# Patient Record
Sex: MALE | Race: WHITE | NOT HISPANIC OR LATINO | Employment: STUDENT | URBAN - METROPOLITAN AREA
[De-identification: names, ages, dates, MRNs, and addresses within clinical notes are randomized per-mention and may not be internally consistent; named-entity substitution may affect disease eponyms.]

---

## 2020-09-18 ENCOUNTER — TELEMEDICINE (OUTPATIENT)
Dept: GASTROENTEROLOGY | Facility: CLINIC | Age: 25
End: 2020-09-18
Payer: COMMERCIAL

## 2020-09-18 VITALS — WEIGHT: 165 LBS | HEIGHT: 72 IN | BODY MASS INDEX: 22.35 KG/M2

## 2020-09-18 DIAGNOSIS — R19.7 DIARRHEA, UNSPECIFIED TYPE: Primary | ICD-10-CM

## 2020-09-18 DIAGNOSIS — R11.0 NAUSEA: ICD-10-CM

## 2020-09-18 PROCEDURE — 99204 OFFICE O/P NEW MOD 45 MIN: CPT | Performed by: INTERNAL MEDICINE

## 2020-09-18 RX ORDER — CETIRIZINE HYDROCHLORIDE 10 MG/1
10 TABLET ORAL DAILY
COMMUNITY

## 2020-09-18 RX ORDER — DICYCLOMINE HYDROCHLORIDE 10 MG/1
10 CAPSULE ORAL
Qty: 120 CAPSULE | Refills: 2 | Status: SHIPPED | OUTPATIENT
Start: 2020-09-18

## 2020-09-18 NOTE — PROGRESS NOTES
Virtual Regular Visit      Assessment/Plan:    Problem List Items Addressed This Visit     None      Visit Diagnoses     Diarrhea, unspecified type    -  Primary    Relevant Medications    dicyclomine (BENTYL) 10 mg capsule    Other Relevant Orders    CBC    Comprehensive metabolic panel    C-reactive protein    Giardia antigen    Ova and parasite examination    Clostridium difficile toxin by PCR with EIA    Celiac Disease Comprehensive Panel    Nausea            Nausea dyspepsia and diarrhea - patient's symptoms are longstanding but worsening recently particularly the diarrhea  Symptoms are most suggestive of diarrhea predominant irritable bowel syndrome  Celiac disease should be excluded  Chronic infectious etiologies such as Giardia are possible but acute infection with C diff has to be ruled out given his recent course of clindamycin  Other bacterial infections less likely  Consider inflammatory bowel disease  · Will try to obtain recent stool studies  · Check CBC CMP CRP and celiac screen  · Stool for Giardia, parasites and C diff  · Trial of dicyclomine  · Reassess in 4-6 weeks, if stools negative and symptoms persist consider endoscopic evaluation for inflammatory bowel disease celiac and microscopic colitis       Reason for visit is   Chief Complaint   Patient presents with    Nausea x6 years, diarrhea off and on x1 year    Virtual Regular Visit        Encounter provider Irma Trammell MD    Provider located at [address] [address] [phone]      Recent Visits  No visits were found meeting these conditions     Showing recent visits within past 7 days and meeting all other requirements     Today's Visits  Date Type Provider Dept   09/18/20 Telemedicine Irma Trammell MD [facility]   Showing today's visits and meeting all other requirements     Future Appointments  No visits were found meeting these conditions  Showing future appointments within next 150 days and meeting all other requirements        The patient was identified by name and date of birth  Leora Ya was informed that this is a telemedicine visit and that the visit is being conducted through [facility] [place] and patient was informed that this is not a secure, HIPAA-complaint platform  He agrees to proceed     My office door was closed  No one else was in the room  He acknowledged consent and understanding of privacy and security of the video platform  The patient has agreed to participate and understands they can discontinue the visit at any time  Patient is aware this is a billable service  Subjective  Leora Ya is a 23 y o  male with several years of dyspepsia nausea and loose stools  HPI   Has history of stomach issues  Worse after taking clindamycin for oral infection  Nausea daily starting six years ago  Worse with hunger and stress  Had EGD/GI work up five years ago  Ruled out gall bladder problem? Took pantoprazole years ago, maybe helped  Little help with Pepcid OTC taking now  Epigastric tenderness  Past year, having alternating diarrhea and constipation  Bloating  Worse in am   Rare vomiting, with stress  Often lose urgent stools in the am   Flaky stools  No blood or melena in stools  Since clindamycin diarrhea is worse  Also had some stomach cramping  Had testing for C diff  and other cultures  ? Celiac testing? History reviewed  No pertinent past medical history      Past Surgical History:   Procedure Laterality Date    UPPER GASTROINTESTINAL ENDOSCOPY      WISDOM TOOTH EXTRACTION         Current Outpatient Medications   Medication Sig Dispense Refill    cetirizine (ZyrTEC) 10 mg tablet Take 10 mg by mouth daily      dicyclomine (BENTYL) 10 mg capsule Take 1 capsule (10 mg total) by mouth 4 (four) times a day (before meals and at bedtime) 120 capsule 2     No current facility-administered medications for this visit  Allergies   Allergen Reactions    Amoxicillin        Review of Systems   Constitutional: Negative for activity change, appetite change, fever and unexpected weight change  Gastrointestinal: Positive for abdominal pain, constipation, diarrhea and nausea  Negative for abdominal distention, blood in stool and vomiting  Video Exam    Vitals:    09/18/20 1349   Weight: 74 8 kg (165 lb)   Height: 6' (1 829 m)       Physical Exam  Abdominal:      General: Abdomen is flat  There is no distension  Tenderness: There is abdominal tenderness  Neurological:      Mental Status: He is alert  epigastric tenderness with palpation by patient    I spent 30 minutes directly with the patient during this visit      Yuki Ziegler acknowledges that he has consented to an online visit or consultation  He understands that the online visit is based solely on information provided by him, and that, in the absence of a face-to-face physical evaluation by the physician, the diagnosis he receives is both limited and provisional in terms of accuracy and completeness  This is not intended to replace a full medical face-to-face evaluation by the physician  Aiyana Freeman understands and accepts these terms

## 2020-09-18 NOTE — PATIENT INSTRUCTIONS
Diet as tolerated  Avoid fatty foods caffeine alcohol and any known trigger foods  Stool studies and blood work  Trial of dicyclomine  Office follow-up in 4-6 weeks

## 2020-09-18 NOTE — LETTER
September 18, 2020     Joanne Vigil MD  [address] [facility] [address]    Patient: Trevin Feliciano   YOB: 1995   Date of Visit: 9/18/2020       Dear Dr Gray Valdes:    Thank you for referring Trevin Feliciano to me for evaluation  Below are my notes for this consultation  If you have questions, please do not hesitate to call me  I look forward to following your patient along with you  Sincerely,        Akiko Naiud MD        CC: No Recipients  Akiko Naidu MD  9/18/2020  5:28 PM  Sign when Signing Visit    Virtual Regular Visit      Assessment/Plan:    Problem List Items Addressed This Visit     None      Visit Diagnoses     Diarrhea, unspecified type    -  Primary    Relevant Medications    dicyclomine (BENTYL) 10 mg capsule    Other Relevant Orders    CBC    Comprehensive metabolic panel    C-reactive protein    Giardia antigen    Ova and parasite examination    Clostridium difficile toxin by PCR with EIA    Celiac Disease Comprehensive Panel    Nausea            Nausea dyspepsia and diarrhea - patient's symptoms are longstanding but worsening recently particularly the diarrhea  Symptoms are most suggestive of diarrhea predominant irritable bowel syndrome  Celiac disease should be excluded  Chronic infectious etiologies such as Giardia are possible but acute infection with C diff has to be ruled out given his recent course of clindamycin  Other bacterial infections less likely  Consider inflammatory bowel disease    · Will try to obtain recent stool studies  · Check CBC CMP CRP and celiac screen  · Stool for Giardia, parasites and C diff  · Trial of dicyclomine  · Reassess in 4-6 weeks, if stools negative and symptoms persist consider endoscopic evaluation for inflammatory bowel disease celiac and microscopic colitis       Reason for visit is   Chief Complaint   Patient presents with    Nausea x6 years, diarrhea off and on x1 year    Virtual Regular Visit        Encounter provider Akiko Naidu MD    Provider located at [address] [id number] [phone]      Recent Visits  No visits were found meeting these conditions  Showing recent visits within past 7 days and meeting all other requirements     Today's Visits  Date Type Provider Dept   09/18/20 Telemedicine Robson Boyce MD [facility]   Showing today's visits and meeting all other requirements     Future Appointments  No visits were found meeting these conditions  Showing future appointments within next 150 days and meeting all other requirements        The patient was identified by name and date of birth  Viviane Cullen was informed that this is a telemedicine visit and that the visit is being conducted through [facility] and patient was informed that this is not a secure, HIPAA-complaint platform  He agrees to proceed     My office door was closed  No one else was in the room  He acknowledged consent and understanding of privacy and security of the video platform  The patient has agreed to participate and understands they can discontinue the visit at any time  Patient is aware this is a billable service  Subjective  Viviane Cullen is a 23 y o  male with several years of dyspepsia nausea and loose stools  HPI   Has history of stomach issues  Worse after taking clindamycin for oral infection  Nausea daily starting six years ago  Worse with hunger and stress  Had EGD/GI work up five years ago  Ruled out gall bladder problem? Took pantoprazole years ago, maybe helped  Little help with Pepcid OTC taking now  Epigastric tenderness  Past year, having alternating diarrhea and constipation  Bloating  Worse in am   Rare vomiting, with stress  Often lose urgent stools in the am   Flaky stools  No blood or melena in stools  Since clindamycin diarrhea is worse  Also had some stomach cramping    Had testing for C diff  and other cultures  ? Celiac testing? History reviewed  No pertinent past medical history  Past Surgical History:   Procedure Laterality Date    UPPER GASTROINTESTINAL ENDOSCOPY      WISDOM TOOTH EXTRACTION         Current Outpatient Medications   Medication Sig Dispense Refill    cetirizine (ZyrTEC) 10 mg tablet Take 10 mg by mouth daily      dicyclomine (BENTYL) 10 mg capsule Take 1 capsule (10 mg total) by mouth 4 (four) times a day (before meals and at bedtime) 120 capsule 2     No current facility-administered medications for this visit  Allergies   Allergen Reactions    Amoxicillin        Review of Systems   Constitutional: Negative for activity change, appetite change, fever and unexpected weight change  Gastrointestinal: Positive for abdominal pain, constipation, diarrhea and nausea  Negative for abdominal distention, blood in stool and vomiting  Video Exam    Vitals:    09/18/20 1349   Weight: 74 8 kg (165 lb)   Height: 6' (1 829 m)       Physical Exam  Abdominal:      General: Abdomen is flat  There is no distension  Tenderness: There is abdominal tenderness  Neurological:      Mental Status: He is alert  epigastric tenderness with palpation by patient    I spent 30 minutes directly with the patient during this visit      Yuki Ziegler acknowledges that he has consented to an online visit or consultation  He understands that the online visit is based solely on information provided by him, and that, in the absence of a face-to-face physical evaluation by the physician, the diagnosis he receives is both limited and provisional in terms of accuracy and completeness  This is not intended to replace a full medical face-to-face evaluation by the physician  Prateek Hauser understands and accepts these terms

## 2020-09-21 ENCOUNTER — TELEPHONE (OUTPATIENT)
Dept: GASTROENTEROLOGY | Facility: CLINIC | Age: 25
End: 2020-09-21

## 2020-09-25 NOTE — TELEPHONE ENCOUNTER
Patient placed on Flagyl 1000 mg day X 10 days and Cephalexin 1500 mg X 7 days for tooth extraction  I asked that patient complete labs/stool studies as ordered

## 2020-09-25 NOTE — TELEPHONE ENCOUNTER
Patient called stating he has been placed on antibiotic for tooth extraction  He is wondering if he should postpone completing testing until off antibiotic   I returned call, no answer, left message to call back to inform us what med he was ordered by dentist

## 2020-09-29 LAB
ALBUMIN SERPL-MCNC: 4.6 G/DL (ref 3.6–5.1)
ALBUMIN/GLOB SERPL: 1.8 (CALC) (ref 1–2.5)
ALP SERPL-CCNC: 77 U/L (ref 36–130)
ALT SERPL-CCNC: 18 U/L (ref 9–46)
AST SERPL-CCNC: 23 U/L (ref 10–40)
BILIRUB SERPL-MCNC: 0.7 MG/DL (ref 0.2–1.2)
BUN SERPL-MCNC: 10 MG/DL (ref 7–25)
BUN/CREAT SERPL: ABNORMAL (CALC) (ref 6–22)
CALCIUM SERPL-MCNC: 9.6 MG/DL (ref 8.6–10.3)
CHLORIDE SERPL-SCNC: 105 MMOL/L (ref 98–110)
CO2 SERPL-SCNC: 27 MMOL/L (ref 20–32)
CREAT SERPL-MCNC: 0.78 MG/DL (ref 0.6–1.35)
CRP SERPL-MCNC: 14.1 MG/L
ERYTHROCYTE [DISTWIDTH] IN BLOOD BY AUTOMATED COUNT: 12.1 % (ref 11–15)
GLOBULIN SER CALC-MCNC: 2.5 G/DL (CALC) (ref 1.9–3.7)
GLUCOSE SERPL-MCNC: 101 MG/DL (ref 65–99)
HCT VFR BLD AUTO: 44.7 % (ref 38.5–50)
HGB BLD-MCNC: 15.4 G/DL (ref 13.2–17.1)
IGA SERPL-MCNC: 558 MG/DL (ref 47–310)
MCH RBC QN AUTO: 31 PG (ref 27–33)
MCHC RBC AUTO-ENTMCNC: 34.5 G/DL (ref 32–36)
MCV RBC AUTO: 89.9 FL (ref 80–100)
PLATELET # BLD AUTO: 110 THOUSAND/UL (ref 140–400)
PMV BLD REES-ECKER: 12.4 FL (ref 7.5–12.5)
POTASSIUM SERPL-SCNC: 3.9 MMOL/L (ref 3.5–5.3)
PROT SERPL-MCNC: 7.1 G/DL (ref 6.1–8.1)
RBC # BLD AUTO: 4.97 MILLION/UL (ref 4.2–5.8)
SL AMB EGFR AFRICAN AMERICAN: 147 ML/MIN/1.73M2
SL AMB EGFR NON AFRICAN AMERICAN: 127 ML/MIN/1.73M2
SODIUM SERPL-SCNC: 141 MMOL/L (ref 135–146)
TTG IGA SER-ACNC: 1 U/ML
WBC # BLD AUTO: 4.6 THOUSAND/UL (ref 3.8–10.8)

## 2020-10-05 LAB — C DIFF TOX GENS STL QL NAA+PROBE: NOT DETECTED

## 2020-10-20 ENCOUNTER — TELEMEDICINE (OUTPATIENT)
Dept: GASTROENTEROLOGY | Facility: CLINIC | Age: 25
End: 2020-10-20
Payer: COMMERCIAL

## 2020-10-20 VITALS — WEIGHT: 165 LBS | BODY MASS INDEX: 22.35 KG/M2 | HEIGHT: 72 IN

## 2020-10-20 DIAGNOSIS — R11.0 NAUSEA: ICD-10-CM

## 2020-10-20 DIAGNOSIS — R19.7 DIARRHEA, UNSPECIFIED TYPE: Primary | ICD-10-CM

## 2020-10-20 PROCEDURE — 99214 OFFICE O/P EST MOD 30 MIN: CPT | Performed by: INTERNAL MEDICINE

## 2020-10-21 ENCOUNTER — TELEPHONE (OUTPATIENT)
Dept: GASTROENTEROLOGY | Facility: CLINIC | Age: 25
End: 2020-10-21

## 2020-11-11 ENCOUNTER — TELEMEDICINE (OUTPATIENT)
Dept: GASTROENTEROLOGY | Facility: CLINIC | Age: 25
End: 2020-11-11

## 2020-11-11 DIAGNOSIS — R19.7 DIARRHEA, UNSPECIFIED TYPE: Primary | ICD-10-CM

## 2020-11-11 DIAGNOSIS — K21.9 GASTROESOPHAGEAL REFLUX DISEASE WITHOUT ESOPHAGITIS: ICD-10-CM

## 2020-11-12 VITALS — WEIGHT: 160 LBS | HEIGHT: 72 IN | BODY MASS INDEX: 21.67 KG/M2

## 2020-11-12 RX ORDER — SODIUM PICOSULFATE, MAGNESIUM OXIDE, AND ANHYDROUS CITRIC ACID 10; 3.5; 12 MG/160ML; G/160ML; G/160ML
LIQUID ORAL
Qty: 2 BOTTLE | Refills: 0 | Status: SHIPPED | OUTPATIENT
Start: 2020-11-12 | End: 2020-11-18 | Stop reason: HOSPADM

## 2020-11-18 ENCOUNTER — ANESTHESIA EVENT (OUTPATIENT)
Dept: GASTROENTEROLOGY | Facility: AMBULATORY SURGERY CENTER | Age: 25
End: 2020-11-18

## 2020-11-18 ENCOUNTER — HOSPITAL ENCOUNTER (OUTPATIENT)
Dept: GASTROENTEROLOGY | Facility: AMBULATORY SURGERY CENTER | Age: 25
Discharge: HOME | End: 2020-11-18
Payer: COMMERCIAL

## 2020-11-18 ENCOUNTER — ANESTHESIA (OUTPATIENT)
Dept: GASTROENTEROLOGY | Facility: AMBULATORY SURGERY CENTER | Age: 25
End: 2020-11-18

## 2020-11-18 VITALS
TEMPERATURE: 98.4 F | RESPIRATION RATE: 13 BRPM | OXYGEN SATURATION: 100 % | DIASTOLIC BLOOD PRESSURE: 62 MMHG | SYSTOLIC BLOOD PRESSURE: 102 MMHG | HEART RATE: 86 BPM

## 2020-11-18 VITALS — HEART RATE: 71 BPM

## 2020-11-18 DIAGNOSIS — K21.9 GASTROESOPHAGEAL REFLUX DISEASE WITHOUT ESOPHAGITIS: Primary | ICD-10-CM

## 2020-11-18 DIAGNOSIS — R19.7 DIARRHEA, UNSPECIFIED TYPE: ICD-10-CM

## 2020-11-18 DIAGNOSIS — R11.0 NAUSEA: ICD-10-CM

## 2020-11-18 PROBLEM — I49.3 ASYMPTOMATIC PVCS: Status: ACTIVE | Noted: 2020-11-18

## 2020-11-18 PROBLEM — K58.9 IRRITABLE BOWEL: Status: ACTIVE | Noted: 2020-11-18

## 2020-11-18 PROCEDURE — 43239 EGD BIOPSY SINGLE/MULTIPLE: CPT | Performed by: INTERNAL MEDICINE

## 2020-11-18 PROCEDURE — 88305 TISSUE EXAM BY PATHOLOGIST: CPT | Performed by: PATHOLOGY

## 2020-11-18 PROCEDURE — 88342 IMHCHEM/IMCYTCHM 1ST ANTB: CPT | Performed by: PATHOLOGY

## 2020-11-18 PROCEDURE — 45380 COLONOSCOPY AND BIOPSY: CPT | Performed by: INTERNAL MEDICINE

## 2020-11-18 RX ORDER — SODIUM CHLORIDE 9 MG/ML
50 INJECTION, SOLUTION INTRAVENOUS CONTINUOUS
Status: DISCONTINUED | OUTPATIENT
Start: 2020-11-18 | End: 2020-11-22 | Stop reason: HOSPADM

## 2020-11-18 RX ORDER — FAMOTIDINE 40 MG/1
40 TABLET, FILM COATED ORAL
Qty: 30 TABLET | Refills: 2 | Status: SHIPPED | OUTPATIENT
Start: 2020-11-18 | End: 2021-02-19 | Stop reason: SDUPTHER

## 2020-11-18 RX ORDER — PROPOFOL 10 MG/ML
INJECTION, EMULSION INTRAVENOUS AS NEEDED
Status: DISCONTINUED | OUTPATIENT
Start: 2020-11-18 | End: 2020-11-18

## 2020-11-18 RX ADMIN — PROPOFOL 100 MG: 10 INJECTION, EMULSION INTRAVENOUS at 11:58

## 2020-11-18 RX ADMIN — PROPOFOL 50 MG: 10 INJECTION, EMULSION INTRAVENOUS at 12:18

## 2020-11-18 RX ADMIN — PROPOFOL 50 MG: 10 INJECTION, EMULSION INTRAVENOUS at 12:12

## 2020-11-18 RX ADMIN — PROPOFOL 50 MG: 10 INJECTION, EMULSION INTRAVENOUS at 12:04

## 2020-11-18 RX ADMIN — PROPOFOL 50 MG: 10 INJECTION, EMULSION INTRAVENOUS at 12:01

## 2020-11-18 RX ADMIN — PROPOFOL 50 MG: 10 INJECTION, EMULSION INTRAVENOUS at 12:00

## 2020-11-18 RX ADMIN — PROPOFOL 50 MG: 10 INJECTION, EMULSION INTRAVENOUS at 12:08

## 2020-11-18 RX ADMIN — SODIUM CHLORIDE 50 ML/HR: 9 INJECTION, SOLUTION INTRAVENOUS at 11:11

## 2020-11-30 ENCOUNTER — TELEPHONE (OUTPATIENT)
Dept: GASTROENTEROLOGY | Facility: CLINIC | Age: 25
End: 2020-11-30

## 2020-12-03 ENCOUNTER — OFFICE VISIT (OUTPATIENT)
Dept: GASTROENTEROLOGY | Facility: CLINIC | Age: 25
End: 2020-12-03
Payer: COMMERCIAL

## 2020-12-03 DIAGNOSIS — R19.7 DIARRHEA, UNSPECIFIED TYPE: Primary | ICD-10-CM

## 2020-12-03 PROCEDURE — 91065 BREATH HYDROGEN/METHANE TEST: CPT | Performed by: INTERNAL MEDICINE

## 2020-12-22 ENCOUNTER — TELEMEDICINE (OUTPATIENT)
Dept: GASTROENTEROLOGY | Facility: CLINIC | Age: 25
End: 2020-12-22
Payer: COMMERCIAL

## 2020-12-22 VITALS — BODY MASS INDEX: 21.67 KG/M2 | HEIGHT: 72 IN | WEIGHT: 160 LBS

## 2020-12-22 DIAGNOSIS — K58.0 IRRITABLE BOWEL SYNDROME WITH DIARRHEA: Primary | ICD-10-CM

## 2020-12-22 DIAGNOSIS — R10.13 DYSPEPSIA: ICD-10-CM

## 2020-12-22 PROCEDURE — 99213 OFFICE O/P EST LOW 20 MIN: CPT | Performed by: INTERNAL MEDICINE

## 2020-12-23 ENCOUNTER — TELEPHONE (OUTPATIENT)
Dept: GASTROENTEROLOGY | Facility: CLINIC | Age: 25
End: 2020-12-23

## 2021-02-19 DIAGNOSIS — K21.9 GASTROESOPHAGEAL REFLUX DISEASE WITHOUT ESOPHAGITIS: ICD-10-CM

## 2021-02-19 RX ORDER — FAMOTIDINE 40 MG/1
TABLET, FILM COATED ORAL
Qty: 30 TABLET | Refills: 2 | Status: SHIPPED | OUTPATIENT
Start: 2021-02-19 | End: 2021-06-01

## 2021-06-01 DIAGNOSIS — K21.9 GASTROESOPHAGEAL REFLUX DISEASE WITHOUT ESOPHAGITIS: ICD-10-CM

## 2021-06-01 RX ORDER — FAMOTIDINE 40 MG/1
TABLET, FILM COATED ORAL
Qty: 30 TABLET | Refills: 2 | Status: SHIPPED | OUTPATIENT
Start: 2021-06-01